# Patient Record
Sex: FEMALE | Race: WHITE | NOT HISPANIC OR LATINO | Employment: FULL TIME | URBAN - NONMETROPOLITAN AREA
[De-identification: names, ages, dates, MRNs, and addresses within clinical notes are randomized per-mention and may not be internally consistent; named-entity substitution may affect disease eponyms.]

---

## 2023-04-26 ENCOUNTER — OFFICE VISIT (OUTPATIENT)
Dept: PRIMARY CARE | Facility: CLINIC | Age: 23
End: 2023-04-26
Payer: COMMERCIAL

## 2023-04-26 VITALS
TEMPERATURE: 97.2 F | DIASTOLIC BLOOD PRESSURE: 62 MMHG | WEIGHT: 140.4 LBS | HEART RATE: 65 BPM | BODY MASS INDEX: 23.73 KG/M2 | SYSTOLIC BLOOD PRESSURE: 96 MMHG | OXYGEN SATURATION: 99 %

## 2023-04-26 DIAGNOSIS — Z02.1 PHYSICAL EXAM, PRE-EMPLOYMENT: Primary | ICD-10-CM

## 2023-04-26 DIAGNOSIS — M86.30 CHRONIC RECURRENT MULTIFOCAL OSTEOMYELITIS (MULTI): ICD-10-CM

## 2023-04-26 PROBLEM — R07.89 STERNUM PAIN: Status: ACTIVE | Noted: 2023-04-26

## 2023-04-26 PROBLEM — R32 INCONTINENCE: Status: ACTIVE | Noted: 2023-04-26

## 2023-04-26 PROBLEM — M89.8X1 CLAVICLE PAIN: Status: ACTIVE | Noted: 2023-04-26

## 2023-04-26 PROBLEM — M79.673 FOOT PAIN: Status: ACTIVE | Noted: 2023-04-26

## 2023-04-26 PROBLEM — M86.261: Status: ACTIVE | Noted: 2023-04-26

## 2023-04-26 PROCEDURE — 1036F TOBACCO NON-USER: CPT | Performed by: INTERNAL MEDICINE

## 2023-04-26 PROCEDURE — 99214 OFFICE O/P EST MOD 30 MIN: CPT | Performed by: INTERNAL MEDICINE

## 2023-04-26 RX ORDER — ACETAMINOPHEN 160 MG/5ML
20.31 SUSPENSION ORAL EVERY 6 HOURS PRN
COMMUNITY
Start: 2016-05-09

## 2023-04-26 RX ORDER — DOCUSATE SODIUM 100 MG/1
CAPSULE, LIQUID FILLED ORAL 2 TIMES DAILY
COMMUNITY
Start: 2016-05-09 | End: 2023-10-12 | Stop reason: ALTCHOICE

## 2023-04-26 RX ORDER — MELOXICAM 7.5 MG/1
1 TABLET ORAL DAILY
COMMUNITY
Start: 2018-06-21 | End: 2023-04-26 | Stop reason: SDUPTHER

## 2023-04-26 RX ORDER — CLINDAMYCIN HYDROCHLORIDE 300 MG/1
CAPSULE ORAL EVERY 8 HOURS
COMMUNITY
Start: 2016-05-09 | End: 2023-10-12 | Stop reason: ALTCHOICE

## 2023-04-26 RX ORDER — MELOXICAM 7.5 MG/1
7.5 TABLET ORAL DAILY
Qty: 90 TABLET | Refills: 0 | Status: SHIPPED | OUTPATIENT
Start: 2023-04-26 | End: 2023-07-25

## 2023-04-26 RX ORDER — NAPROXEN 500 MG/1
TABLET ORAL 2 TIMES DAILY
COMMUNITY
Start: 2023-04-24 | End: 2023-05-07

## 2023-04-26 ASSESSMENT — ENCOUNTER SYMPTOMS
BLOOD IN STOOL: 0
ARTHRALGIAS: 1
DIFFICULTY URINATING: 0
UNEXPECTED WEIGHT CHANGE: 0
DIARRHEA: 0
SINUS PAIN: 0
HEADACHES: 0
WHEEZING: 0
DIZZINESS: 0
SORE THROAT: 0
COUGH: 0
FATIGUE: 0
ABDOMINAL PAIN: 0
FEVER: 0
PALPITATIONS: 0
BRUISES/BLEEDS EASILY: 0

## 2023-04-26 NOTE — PROGRESS NOTES
Subjective   Patient ID: Steffanie Dan is a 22 y.o. female who presents for er follow for lt foot sprain, Employment Physical, clavicle bump, and osteomyletis.    - Preemployment physical exam  Patient free of any communicable diseases able to perform her work duties without any restrictions  -  CRMO  - Recurrent pain in the foot patient not taking her medication  Need to start on meloxicam as recommended follow-up if no improvement  Need to obtain CRP CBC if no improvement and hepatic panel  Reevaluate as needed           Review of Systems   Constitutional:  Negative for fatigue, fever and unexpected weight change.   HENT:  Negative for congestion, ear discharge, ear pain, mouth sores, sinus pain and sore throat.    Eyes:  Negative for visual disturbance.   Respiratory:  Negative for cough and wheezing.    Cardiovascular:  Negative for chest pain, palpitations and leg swelling.   Gastrointestinal:  Negative for abdominal pain, blood in stool and diarrhea.   Genitourinary:  Negative for difficulty urinating.   Musculoskeletal:  Positive for arthralgias.   Skin:  Negative for rash.   Neurological:  Negative for dizziness and headaches.   Hematological:  Does not bruise/bleed easily.   Psychiatric/Behavioral:  Negative for behavioral problems.    All other systems reviewed and are negative.      Objective   No results found for: HGBA1C   BP 96/62   Pulse 65   Temp 36.2 °C (97.2 °F)   Wt 63.7 kg (140 lb 6.4 oz)   SpO2 99%   BMI 23.73 kg/m²     Physical Exam  Vitals and nursing note reviewed.   Constitutional:       Appearance: Normal appearance.   HENT:      Head: Normocephalic.      Nose: Nose normal.   Eyes:      Conjunctiva/sclera: Conjunctivae normal.      Pupils: Pupils are equal, round, and reactive to light.   Cardiovascular:      Rate and Rhythm: Regular rhythm.   Pulmonary:      Effort: Pulmonary effort is normal.      Breath sounds: Normal breath sounds.   Abdominal:      General: Abdomen is flat.       Palpations: Abdomen is soft.   Musculoskeletal:         General: Tenderness present.      Cervical back: Neck supple.   Skin:     General: Skin is warm.   Neurological:      General: No focal deficit present.      Mental Status: She is oriented to person, place, and time.   Psychiatric:         Mood and Affect: Mood normal.         Assessment/Plan   Steffanie was seen today for er follow for lt foot sprain, employment physical, clavicle bump and osteomyletis.  Diagnoses and all orders for this visit:  Physical exam, pre-employment (Primary)  Chronic recurrent multifocal osteomyelitis (CMS/HCC)  -     meloxicam (Mobic) 7.5 mg tablet; Take 1 tablet (7.5 mg) by mouth once daily.

## 2023-10-12 ENCOUNTER — OFFICE VISIT (OUTPATIENT)
Dept: PRIMARY CARE | Facility: CLINIC | Age: 23
End: 2023-10-12
Payer: COMMERCIAL

## 2023-10-12 VITALS
OXYGEN SATURATION: 98 % | TEMPERATURE: 97.5 F | DIASTOLIC BLOOD PRESSURE: 62 MMHG | SYSTOLIC BLOOD PRESSURE: 94 MMHG | WEIGHT: 144.2 LBS | HEART RATE: 87 BPM | BODY MASS INDEX: 24.37 KG/M2

## 2023-10-12 DIAGNOSIS — J20.9 ACUTE BRONCHITIS, UNSPECIFIED ORGANISM: Primary | ICD-10-CM

## 2023-10-12 DIAGNOSIS — M86.30 CHRONIC RECURRENT MULTIFOCAL OSTEOMYELITIS (MULTI): ICD-10-CM

## 2023-10-12 PROCEDURE — 99214 OFFICE O/P EST MOD 30 MIN: CPT | Performed by: INTERNAL MEDICINE

## 2023-10-12 PROCEDURE — 1036F TOBACCO NON-USER: CPT | Performed by: INTERNAL MEDICINE

## 2023-10-12 RX ORDER — AZITHROMYCIN 250 MG/1
TABLET, FILM COATED ORAL
Qty: 6 TABLET | Refills: 0 | Status: SHIPPED | OUTPATIENT
Start: 2023-10-12 | End: 2023-10-17

## 2023-10-12 RX ORDER — MELOXICAM 7.5 MG/1
TABLET ORAL
COMMUNITY
Start: 2019-11-20

## 2023-10-12 ASSESSMENT — ENCOUNTER SYMPTOMS
SINUS PAIN: 0
BRUISES/BLEEDS EASILY: 0
ARTHRALGIAS: 0
DIZZINESS: 0
SORE THROAT: 1
DIFFICULTY URINATING: 0
FATIGUE: 1
FEVER: 1
BLOOD IN STOOL: 0
UNEXPECTED WEIGHT CHANGE: 0
ABDOMINAL PAIN: 0
DIARRHEA: 0
HEADACHES: 0
COUGH: 1
PALPITATIONS: 0
WHEEZING: 0

## 2023-10-12 NOTE — PROGRESS NOTES
"Subjective   Patient ID: Steffanie Dan is a 23 y.o. female who presents for Cough (X 2 weeks, dry) and Sore Throat.    - Patient working in a , complaining of cough and congestion worsening now over the last week no fever no chills  Fatigue  Joint aches  -Physical exam unremarkable underlying mild bronchitis patient will be treated with Z-Dayo increase fluid intake need to stay off work until Monday follow-up results closely  Chronic recurrent multifocal osteomyelitis (CMS/HCC)  -     meloxicam (Mobic) 7.5 mg tablet; Take 1 tablet (7.5 mg) by mouth once daily.  Stable no change  Increase fluid intake and bedrest         Cough  Associated symptoms include a fever and a sore throat. Pertinent negatives include no chest pain, ear pain, headaches, rash or wheezing.   Sore Throat   Associated symptoms include coughing. Pertinent negatives include no abdominal pain, congestion, diarrhea, ear discharge, ear pain or headaches.          Review of Systems   Constitutional:  Positive for fatigue and fever. Negative for unexpected weight change.   HENT:  Positive for sore throat. Negative for congestion, ear discharge, ear pain, mouth sores and sinus pain.    Eyes:  Negative for visual disturbance.   Respiratory:  Positive for cough. Negative for wheezing.    Cardiovascular:  Negative for chest pain, palpitations and leg swelling.   Gastrointestinal:  Negative for abdominal pain, blood in stool and diarrhea.   Genitourinary:  Negative for difficulty urinating.   Musculoskeletal:  Negative for arthralgias.   Skin:  Negative for rash.   Neurological:  Negative for dizziness and headaches.   Hematological:  Does not bruise/bleed easily.   Psychiatric/Behavioral:  Negative for behavioral problems.    All other systems reviewed and are negative.      Objective   No results found for: \"HGBA1C\"   BP 94/62   Pulse 87   Temp 36.4 °C (97.5 °F)   Wt 65.4 kg (144 lb 3.2 oz)   SpO2 98%   BMI 24.37 kg/m²   Lab Results "   Component Value Date    WBC 5.7 06/28/2021    HGB 14.1 06/28/2021    HCT 43.3 06/28/2021     06/28/2021    ALT 10 06/28/2021    AST 13 06/28/2021     06/28/2021    K 3.9 06/28/2021     06/28/2021    CREATININE 0.67 06/28/2021    BUN 11 06/28/2021    CO2 28 06/28/2021    TSH 1.84 06/28/2021     par   Physical Exam  Vitals and nursing note reviewed.   Constitutional:       Appearance: Normal appearance.   HENT:      Head: Normocephalic.      Right Ear: There is no impacted cerumen.      Left Ear: There is no impacted cerumen.      Nose: Nose normal.      Mouth/Throat:      Pharynx: No oropharyngeal exudate or posterior oropharyngeal erythema.   Eyes:      General:         Left eye: No discharge.      Conjunctiva/sclera: Conjunctivae normal.      Pupils: Pupils are equal, round, and reactive to light.   Cardiovascular:      Rate and Rhythm: Regular rhythm.   Pulmonary:      Effort: Pulmonary effort is normal.      Breath sounds: Normal breath sounds. No rhonchi.   Abdominal:      General: Abdomen is flat.      Palpations: Abdomen is soft.   Musculoskeletal:      Cervical back: Neck supple.   Skin:     General: Skin is warm.   Neurological:      General: No focal deficit present.      Mental Status: She is oriented to person, place, and time.   Psychiatric:         Mood and Affect: Mood normal.       Assessment/Plan   Steffanie was seen today for cough and sore throat.  Diagnoses and all orders for this visit:  Acute bronchitis, unspecified organism (Primary)  -     azithromycin (Zithromax) 250 mg tablet; Take 2 tablets (500 mg) by mouth once daily for 1 day, THEN 1 tablet (250 mg) once daily for 4 days. Take 2 tabs (500 mg) by mouth today, than 1 daily for 4 days..  Chronic recurrent multifocal osteomyelitis (CMS/HCC)     Patient working in a , complaining of cough and congestion worsening now over the last week no fever no chills  Fatigue  Joint aches  -Physical exam unremarkable underlying  mild bronchitis patient will be treated with Z-Dayo increase fluid intake need to stay off work until Monday follow-up results closely  Chronic recurrent multifocal osteomyelitis (CMS/HCC)  -     meloxicam (Mobic) 7.5 mg tablet; Take 1 tablet (7.5 mg) by mouth once daily.  Stable no change  Increase fluid intake and bedrest

## 2024-04-15 ENCOUNTER — OFFICE VISIT (OUTPATIENT)
Dept: RHEUMATOLOGY | Facility: CLINIC | Age: 24
End: 2024-04-15
Payer: COMMERCIAL

## 2024-04-15 VITALS
WEIGHT: 140 LBS | HEIGHT: 65 IN | HEART RATE: 73 BPM | DIASTOLIC BLOOD PRESSURE: 77 MMHG | BODY MASS INDEX: 23.32 KG/M2 | SYSTOLIC BLOOD PRESSURE: 113 MMHG

## 2024-04-15 DIAGNOSIS — M86.30 CHRONIC RECURRENT MULTIFOCAL OSTEOMYELITIS (MULTI): Primary | ICD-10-CM

## 2024-04-15 PROCEDURE — 99203 OFFICE O/P NEW LOW 30 MIN: CPT | Performed by: INTERNAL MEDICINE

## 2024-04-15 RX ORDER — NAPROXEN 500 MG/1
500 TABLET ORAL 2 TIMES DAILY PRN
COMMUNITY

## 2024-04-15 NOTE — PROGRESS NOTES
Subjective   Patient ID: Steffanie Dan is a 23 y.o. female who presents for New Patient Visit.  HPI  Patient with history of chronic recurrent multifocal osteomyelitis previously seen by Dr. Ping Raines.  I accompanied by her mother today.    She started having symptoms when she was about 16 years old.  Feels like she was walking on nails.  This symptom is not present today.  She had a surgery in 2016 for what was thought to be an infected bone.  She has been on chronic anti-inflammatories including meloxicam.  There was some concerns about medication compliance at the time.  Currently only takes anti-inflammatories as needed.  Activities include mostly walking.  Denies any injuries  Has a history of febrile seizures   social history is negative for tobacco, alcohol, illicit drug use  Family history of diabetes, hypertension, thyroid disease possible lupus.  Review of Systems   Constitutional:  Negative for fatigue and fever.   HENT:  Negative for mouth sores.    Eyes:  Negative for pain and visual disturbance.   Respiratory:  Negative for shortness of breath.    Cardiovascular:  Negative for chest pain.   Gastrointestinal:  Negative for abdominal pain.   Musculoskeletal:         Morning stiffness about 10 minutes   Skin:  Negative for rash.   Neurological:  Negative for headaches.   Hematological:  Negative for adenopathy.       Objective   Physical Exam  GEN: NAD A&O x3 appropriate affect  HENT:M no enlarged glands or thyroid  EYES:  no conjunctival redness, eyelids normal  LYMPH: no cervical  lymphadenopathy  SKIN: no rashes, ulcers, or subcutaneous nodules  PULSES: +radials  TENDER POINTS: 0/18   MSK:  No synovitis or joint tenderness  Assessment/Plan     Patient with chronic recurrent multifocal osteomyelitis.  This is considered a sterile osteomyelitis and not infection.  Previous treatments have included anti-inflammatories.  Has been treated mostly with anti-inflammatories.  Discussed that this can  also be treated with anti-TNF inhibitors depending on involvement.  Will check Her blood work.  If she does have elevated inflammatory markers may consider other treatment then NSAID.  Will also get a new PET scan.  Will discuss her results once they have returned.    Autumn Murray MD 04/15/24 1:40 PM

## 2024-04-26 ENCOUNTER — LAB (OUTPATIENT)
Dept: LAB | Facility: LAB | Age: 24
End: 2024-04-26
Payer: COMMERCIAL

## 2024-04-26 DIAGNOSIS — M86.30 CHRONIC RECURRENT MULTIFOCAL OSTEOMYELITIS (MULTI): ICD-10-CM

## 2024-04-26 LAB
ALBUMIN SERPL BCP-MCNC: 4.5 G/DL (ref 3.4–5)
ALP SERPL-CCNC: 63 U/L (ref 33–110)
ALT SERPL W P-5'-P-CCNC: 11 U/L (ref 7–45)
ANION GAP SERPL CALC-SCNC: 13 MMOL/L (ref 10–20)
AST SERPL W P-5'-P-CCNC: 14 U/L (ref 9–39)
BILIRUB SERPL-MCNC: 0.4 MG/DL (ref 0–1.2)
BUN SERPL-MCNC: 15 MG/DL (ref 6–23)
CALCIUM SERPL-MCNC: 9.6 MG/DL (ref 8.6–10.3)
CHLORIDE SERPL-SCNC: 105 MMOL/L (ref 98–107)
CO2 SERPL-SCNC: 24 MMOL/L (ref 21–32)
CREAT SERPL-MCNC: 0.64 MG/DL (ref 0.5–1.05)
CRP SERPL-MCNC: <0.1 MG/DL
EGFRCR SERPLBLD CKD-EPI 2021: >90 ML/MIN/1.73M*2
ERYTHROCYTE [DISTWIDTH] IN BLOOD BY AUTOMATED COUNT: 12.5 % (ref 11.5–14.5)
ERYTHROCYTE [SEDIMENTATION RATE] IN BLOOD BY WESTERGREN METHOD: 8 MM/H (ref 0–20)
GLUCOSE SERPL-MCNC: 98 MG/DL (ref 74–99)
HCT VFR BLD AUTO: 40.7 % (ref 36–46)
HGB BLD-MCNC: 13.4 G/DL (ref 12–16)
MCH RBC QN AUTO: 29.8 PG (ref 26–34)
MCHC RBC AUTO-ENTMCNC: 32.9 G/DL (ref 32–36)
MCV RBC AUTO: 90 FL (ref 80–100)
NRBC BLD-RTO: 0 /100 WBCS (ref 0–0)
PLATELET # BLD AUTO: 337 X10*3/UL (ref 150–450)
POTASSIUM SERPL-SCNC: 3.8 MMOL/L (ref 3.5–5.3)
PROT SERPL-MCNC: 7.7 G/DL (ref 6.4–8.2)
RBC # BLD AUTO: 4.5 X10*6/UL (ref 4–5.2)
SODIUM SERPL-SCNC: 138 MMOL/L (ref 136–145)
WBC # BLD AUTO: 5.8 X10*3/UL (ref 4.4–11.3)

## 2024-04-26 PROCEDURE — 86038 ANTINUCLEAR ANTIBODIES: CPT

## 2024-04-26 PROCEDURE — 83519 RIA NONANTIBODY: CPT

## 2024-04-26 PROCEDURE — 81381 HLA I TYPING 1 ALLELE HR: CPT

## 2024-04-26 PROCEDURE — 84078 ASSAY ALKALINE PHOSPHATASE: CPT

## 2024-04-26 PROCEDURE — 86235 NUCLEAR ANTIGEN ANTIBODY: CPT

## 2024-04-26 PROCEDURE — 86200 CCP ANTIBODY: CPT

## 2024-04-26 PROCEDURE — 86225 DNA ANTIBODY NATIVE: CPT

## 2024-04-26 PROCEDURE — 36415 COLL VENOUS BLD VENIPUNCTURE: CPT

## 2024-04-26 PROCEDURE — 86431 RHEUMATOID FACTOR QUANT: CPT

## 2024-04-27 LAB
CCP IGG SERPL-ACNC: <1 U/ML
CENTROMERE B AB SER-ACNC: <0.2 AI
CHROMATIN AB SERPL-ACNC: <0.2 AI
DSDNA AB SER-ACNC: 2 IU/ML
ENA JO1 AB SER QL IA: <0.2 AI
ENA RNP AB SER IA-ACNC: <0.2 AI
ENA SCL70 AB SER QL IA: <0.2 AI
ENA SM AB SER IA-ACNC: <0.2 AI
ENA SM+RNP AB SER QL IA: <0.2 AI
ENA SS-A AB SER IA-ACNC: <0.2 AI
ENA SS-B AB SER IA-ACNC: <0.2 AI
RHEUMATOID FACT SER NEPH-ACNC: <10 IU/ML (ref 0–15)
RIBOSOMAL P AB SER-ACNC: <0.2 AI

## 2024-04-28 LAB — ALP BONE SERPL-MCNC: 10.8 UG/L

## 2024-04-30 LAB
ANA PATTERN: ABNORMAL
ANA SER QL HEP2 SUBST: POSITIVE
ANA TITR SER IF: ABNORMAL {TITER}
GAD65 AB SER IA-ACNC: <5 IU/ML (ref 0–5)

## 2024-05-01 LAB — HLAB27 TYPING: NEGATIVE

## 2024-05-10 ENCOUNTER — HOSPITAL ENCOUNTER (OUTPATIENT)
Dept: RADIOLOGY | Facility: HOSPITAL | Age: 24
Discharge: HOME | End: 2024-05-10
Payer: COMMERCIAL

## 2024-05-10 DIAGNOSIS — M86.30 CHRONIC RECURRENT MULTIFOCAL OSTEOMYELITIS (MULTI): ICD-10-CM

## 2024-05-10 PROCEDURE — 78306 BONE IMAGING WHOLE BODY: CPT | Performed by: NUCLEAR MEDICINE

## 2024-05-10 PROCEDURE — 78306 BONE IMAGING WHOLE BODY: CPT

## 2024-05-10 PROCEDURE — 3430000001 HC RX 343 DIAGNOSTIC RADIOPHARMACEUTICALS: Performed by: INTERNAL MEDICINE

## 2024-05-10 PROCEDURE — A9503 TC99M MEDRONATE: HCPCS | Performed by: INTERNAL MEDICINE

## 2024-05-10 RX ADMIN — TECHNETIUM TC 99M MEDRONATE 25.9 MILLICURIE: 25 INJECTION, POWDER, FOR SOLUTION INTRAVENOUS at 10:10

## 2024-05-13 ASSESSMENT — ENCOUNTER SYMPTOMS
HEADACHES: 0
ABDOMINAL PAIN: 0
FATIGUE: 0
ADENOPATHY: 0
FEVER: 0
EYE PAIN: 0
SHORTNESS OF BREATH: 0

## 2024-09-18 ENCOUNTER — APPOINTMENT (OUTPATIENT)
Dept: PRIMARY CARE | Facility: CLINIC | Age: 24
End: 2024-09-18
Payer: COMMERCIAL

## 2024-09-18 VITALS
HEART RATE: 67 BPM | WEIGHT: 146.4 LBS | TEMPERATURE: 96.8 F | BODY MASS INDEX: 24.74 KG/M2 | DIASTOLIC BLOOD PRESSURE: 62 MMHG | OXYGEN SATURATION: 97 % | SYSTOLIC BLOOD PRESSURE: 100 MMHG

## 2024-09-18 DIAGNOSIS — Z02.1 PHYSICAL EXAM, PRE-EMPLOYMENT: Primary | ICD-10-CM

## 2024-09-18 PROCEDURE — 1036F TOBACCO NON-USER: CPT | Performed by: INTERNAL MEDICINE

## 2024-09-18 PROCEDURE — 99395 PREV VISIT EST AGE 18-39: CPT | Performed by: INTERNAL MEDICINE

## 2024-09-18 ASSESSMENT — ENCOUNTER SYMPTOMS
COUGH: 0
WHEEZING: 0
DIZZINESS: 0
PALPITATIONS: 0
FATIGUE: 0
HEADACHES: 0
ABDOMINAL PAIN: 0
FEVER: 0
ARTHRALGIAS: 0
BLOOD IN STOOL: 0
BRUISES/BLEEDS EASILY: 0
SINUS PAIN: 0
DIFFICULTY URINATING: 0
DIARRHEA: 0
SORE THROAT: 0
UNEXPECTED WEIGHT CHANGE: 0

## 2024-09-18 NOTE — PROGRESS NOTES
"Subjective   Patient ID: Steffanie Dan is a 24 y.o. female who presents for paperwork (Paperwork for work).    Today for preemployment physical exam working in a  denies any complaints  -Needs titers for previous vaccinations order placed  -Needs a T spot test  - Chronic recurrent multifocal osteomyelitis (CMS/HCC)  -     meloxicam (Mobic) 7.5 mg tablet; Take 1 tablet (7.5 mg) by mouth once daily.  Stable no change  Increase fluid intake and bedrest  Patient patient physically fit for  employment  Will follow-up with vaccination titer accordingly                Review of Systems   Constitutional:  Negative for fatigue, fever and unexpected weight change.   HENT:  Negative for congestion, ear discharge, ear pain, mouth sores, sinus pain and sore throat.    Eyes:  Negative for visual disturbance.   Respiratory:  Negative for cough and wheezing.    Cardiovascular:  Negative for chest pain, palpitations and leg swelling.   Gastrointestinal:  Negative for abdominal pain, blood in stool and diarrhea.   Genitourinary:  Negative for difficulty urinating.   Musculoskeletal:  Negative for arthralgias.   Skin:  Negative for rash.   Neurological:  Negative for dizziness and headaches.   Hematological:  Does not bruise/bleed easily.   Psychiatric/Behavioral:  Negative for behavioral problems.    All other systems reviewed and are negative.      Objective   No results found for: \"HGBA1C\"   /62   Pulse 67   Temp 36 °C (96.8 °F)   Wt 66.4 kg (146 lb 6.4 oz)   SpO2 97%   BMI 24.74 kg/m²   Lab Results   Component Value Date    WBC 5.8 04/26/2024    HGB 13.4 04/26/2024    HCT 40.7 04/26/2024     04/26/2024    ALT 11 04/26/2024    AST 14 04/26/2024     04/26/2024    K 3.8 04/26/2024     04/26/2024    CREATININE 0.64 04/26/2024    BUN 15 04/26/2024    CO2 24 04/26/2024    TSH 1.84 06/28/2021     par   Physical Exam  Vitals and nursing note reviewed.   Constitutional:       Appearance: Normal " appearance.   HENT:      Head: Normocephalic.      Nose: Nose normal.   Eyes:      Conjunctiva/sclera: Conjunctivae normal.      Pupils: Pupils are equal, round, and reactive to light.   Cardiovascular:      Rate and Rhythm: Regular rhythm.   Pulmonary:      Effort: Pulmonary effort is normal.      Breath sounds: Normal breath sounds.   Abdominal:      General: Abdomen is flat.      Palpations: Abdomen is soft.   Musculoskeletal:      Cervical back: Neck supple.   Skin:     General: Skin is warm.   Neurological:      General: No focal deficit present.      Mental Status: She is oriented to person, place, and time.   Psychiatric:         Mood and Affect: Mood normal.         Assessment/Plan   Steffanie was seen today for paperwork.  Diagnoses and all orders for this visit:  Physical exam, pre-employment (Primary)  -     Hepatitis B surface antibody; Future  -     Diphtheria Antibody; Future  -     Mumps Antibody, IgG; Future  -     Rubella Antibody, IgG; Future  -     Rubeola Antibody, IgG; Future  -     T-Spot TB; Future  -     Varicella Zoster Antibody, IgG; Future   Today for preemployment physical exam working in a  denies any complaints  -Needs titers for previous vaccinations order placed  -Needs a T spot test  - Chronic recurrent multifocal osteomyelitis (CMS/HCC)  -     meloxicam (Mobic) 7.5 mg tablet; Take 1 tablet (7.5 mg) by mouth once daily.  Stable no change  Increase fluid intake and bedrest  Patient patient physically fit for  employment  Will follow-up with vaccination titer accordingly

## 2024-10-17 ENCOUNTER — LAB (OUTPATIENT)
Dept: LAB | Facility: LAB | Age: 24
End: 2024-10-17

## 2024-10-17 DIAGNOSIS — Z02.1 PHYSICAL EXAM, PRE-EMPLOYMENT: ICD-10-CM

## 2024-10-17 LAB
HBV SURFACE AB SER-ACNC: 59.8 MIU/ML
MEV IGG SER QL IA: POSITIVE
MUMPS IGG ANTIBODY INDEX: 3.3 IA
MUV IGG SER IA-ACNC: POSITIVE
RUBEOLA IGG ANTIBODY INDEX: 3.8 IA
RUBV IGG SERPL IA-ACNC: 2.3 IA
RUBV IGG SERPL QL IA: POSITIVE
VARICELLA ZOSTER IGG INDEX: 0.9 IA
VZV IGG SER QL IA: ABNORMAL

## 2024-10-17 PROCEDURE — 86765 RUBEOLA ANTIBODY: CPT

## 2024-10-17 PROCEDURE — 36415 COLL VENOUS BLD VENIPUNCTURE: CPT

## 2024-10-17 PROCEDURE — 86481 TB AG RESPONSE T-CELL SUSP: CPT

## 2024-10-17 PROCEDURE — 86735 MUMPS ANTIBODY: CPT

## 2024-10-17 PROCEDURE — 86706 HEP B SURFACE ANTIBODY: CPT

## 2024-10-17 PROCEDURE — 86787 VARICELLA-ZOSTER ANTIBODY: CPT

## 2024-10-17 PROCEDURE — 86317 IMMUNOASSAY INFECTIOUS AGENT: CPT

## 2024-10-19 LAB
NIL(NEG) CONTROL SPOT COUNT: NORMAL
PANEL A SPOT COUNT: 0
PANEL B SPOT COUNT: 0
POS CONTROL SPOT COUNT: NORMAL
T-SPOT. TB INTERPRETATION: NEGATIVE

## 2024-10-21 LAB — C DIPHTHERIAE IGG SER-ACNC: 0.2 IU/ML

## 2024-10-22 ENCOUNTER — HOSPITAL ENCOUNTER (OUTPATIENT)
Dept: RADIOLOGY | Facility: HOSPITAL | Age: 24
Discharge: HOME | End: 2024-10-22
Payer: COMMERCIAL

## 2024-10-22 ENCOUNTER — TELEPHONE (OUTPATIENT)
Dept: PRIMARY CARE | Facility: CLINIC | Age: 24
End: 2024-10-22
Payer: COMMERCIAL

## 2024-10-22 ENCOUNTER — OFFICE VISIT (OUTPATIENT)
Dept: PRIMARY CARE | Facility: CLINIC | Age: 24
End: 2024-10-22
Payer: COMMERCIAL

## 2024-10-22 VITALS
BODY MASS INDEX: 24.5 KG/M2 | HEART RATE: 79 BPM | DIASTOLIC BLOOD PRESSURE: 72 MMHG | TEMPERATURE: 97.2 F | SYSTOLIC BLOOD PRESSURE: 112 MMHG | OXYGEN SATURATION: 99 % | WEIGHT: 145 LBS

## 2024-10-22 DIAGNOSIS — J45.31 MILD PERSISTENT ASTHMATIC BRONCHITIS WITH ACUTE EXACERBATION (HHS-HCC): Primary | ICD-10-CM

## 2024-10-22 DIAGNOSIS — M86.30 CHRONIC RECURRENT MULTIFOCAL OSTEOMYELITIS (MULTI): ICD-10-CM

## 2024-10-22 DIAGNOSIS — J45.31 MILD PERSISTENT ASTHMATIC BRONCHITIS WITH ACUTE EXACERBATION (HHS-HCC): ICD-10-CM

## 2024-10-22 PROCEDURE — 99214 OFFICE O/P EST MOD 30 MIN: CPT | Performed by: INTERNAL MEDICINE

## 2024-10-22 PROCEDURE — 1036F TOBACCO NON-USER: CPT | Performed by: INTERNAL MEDICINE

## 2024-10-22 PROCEDURE — 71046 X-RAY EXAM CHEST 2 VIEWS: CPT

## 2024-10-22 PROCEDURE — 71046 X-RAY EXAM CHEST 2 VIEWS: CPT | Performed by: RADIOLOGY

## 2024-10-22 RX ORDER — AZITHROMYCIN 250 MG/1
TABLET, FILM COATED ORAL
Qty: 6 TABLET | Refills: 0 | Status: SHIPPED | OUTPATIENT
Start: 2024-10-22 | End: 2024-10-27

## 2024-10-22 RX ORDER — METHYLPREDNISOLONE 4 MG/1
TABLET ORAL
Qty: 21 TABLET | Refills: 0 | Status: SHIPPED | OUTPATIENT
Start: 2024-10-22 | End: 2024-10-29

## 2024-10-22 ASSESSMENT — ENCOUNTER SYMPTOMS
HEADACHES: 0
SORE THROAT: 0
FEVER: 1
UNEXPECTED WEIGHT CHANGE: 0
VOMITING: 1
DIARRHEA: 0
ARTHRALGIAS: 0
WHEEZING: 0
PALPITATIONS: 0
FATIGUE: 1
DIFFICULTY URINATING: 0
ABDOMINAL PAIN: 0
DIZZINESS: 0
SINUS PAIN: 0
COUGH: 1
BRUISES/BLEEDS EASILY: 0
BLOOD IN STOOL: 0

## 2024-10-22 NOTE — PROGRESS NOTES
Subjective   Patient ID: Steffanie Dan is a 24 y.o. female who presents for Vomiting, Cough (With wheezing x 7 days), Fatigue, chest hurt, Anorexia (Loss of appetite), Fever, and Chills.    - Patient complaining of nausea vomiting abdominal pain since Thursday  -Tested twice negative for COVID  -Asthmatic bronchitis  Obtain x-ray starting Z-Dayo  Start Medrol Dosepak  - Nausea and vomiting.  Avoid any acidic food warm fluids Tylenol as needed follow-up if no improvement  May use Pepcid over-the-counter as needed  - Preemployment physical exam and blood work came back negative patient may proceed with employment as recommended  - Patient need to stay 3 days off work until complete resolution of her symptoms [facility]  - History of chronic multifocal recurrent osteomyelitis: No change continue with meloxicam    Vomiting   Associated symptoms include coughing and a fever. Pertinent negatives include no abdominal pain, arthralgias, chest pain, diarrhea, dizziness or headaches.   Cough  Associated symptoms include a fever. Pertinent negatives include no chest pain, ear pain, headaches, rash, sore throat or wheezing.   Fatigue  Associated symptoms include coughing, fatigue, a fever and vomiting. Pertinent negatives include no abdominal pain, arthralgias, chest pain, congestion, headaches, rash or sore throat.   Fever   Associated symptoms include coughing and vomiting. Pertinent negatives include no abdominal pain, chest pain, congestion, diarrhea, ear pain, headaches, rash, sore throat or wheezing.          Review of Systems   Constitutional:  Positive for fatigue and fever. Negative for unexpected weight change.   HENT:  Negative for congestion, ear discharge, ear pain, mouth sores, sinus pain and sore throat.    Eyes:  Negative for visual disturbance.   Respiratory:  Positive for cough. Negative for wheezing.    Cardiovascular:  Negative for chest pain, palpitations and leg swelling.  "  Gastrointestinal:  Positive for vomiting. Negative for abdominal pain, blood in stool and diarrhea.   Genitourinary:  Negative for difficulty urinating.   Musculoskeletal:  Negative for arthralgias.   Skin:  Negative for rash.   Neurological:  Negative for dizziness and headaches.   Hematological:  Does not bruise/bleed easily.   Psychiatric/Behavioral:  Negative for behavioral problems.    All other systems reviewed and are negative.      Objective   No results found for: \"HGBA1C\"   /72   Pulse 79   Temp 36.2 °C (97.2 °F)   Wt 65.8 kg (145 lb)   SpO2 99%   BMI 24.50 kg/m²     Physical Exam  Vitals and nursing note reviewed.   Constitutional:       Appearance: Normal appearance.   HENT:      Head: Normocephalic.      Nose: Nose normal.   Eyes:      Conjunctiva/sclera: Conjunctivae normal.      Pupils: Pupils are equal, round, and reactive to light.   Cardiovascular:      Rate and Rhythm: Regular rhythm.   Pulmonary:      Effort: Pulmonary effort is normal.      Breath sounds: Wheezing present.   Abdominal:      General: Abdomen is flat.      Palpations: Abdomen is soft.   Musculoskeletal:      Cervical back: Neck supple.   Skin:     General: Skin is warm.   Neurological:      General: No focal deficit present.      Mental Status: She is oriented to person, place, and time.   Psychiatric:         Mood and Affect: Mood normal.       Lab Results   Component Value Date    WBC 5.8 04/26/2024    HGB 13.4 04/26/2024    HCT 40.7 04/26/2024     04/26/2024    ALT 11 04/26/2024    AST 14 04/26/2024     04/26/2024    K 3.8 04/26/2024     04/26/2024    CREATININE 0.64 04/26/2024    BUN 15 04/26/2024    CO2 24 04/26/2024    TSH 1.84 06/28/2021     par     Assessment/Plan   Steffanie was seen today for vomiting, cough, fatigue, chest hurt, anorexia, fever and chills.  Diagnoses and all orders for this visit:  Mild persistent asthmatic bronchitis with acute exacerbation (HHS-HCC) (Primary)  -     XR " chest 2 views; Future  -     methylPREDNISolone (Medrol Dospak) 4 mg tablets; Follow schedule on package instructions  -     azithromycin (Zithromax) 250 mg tablet; Take 2 tablets (500 mg) by mouth once daily for 1 day, THEN 1 tablet (250 mg) once daily for 4 days. Take 2 tabs (500 mg) by mouth today, than 1 daily for 4 days..  Chronic recurrent multifocal osteomyelitis (Multi)   - Patient complaining of nausea vomiting abdominal pain since Thursday  -Tested twice negative for COVID  -Asthmatic bronchitis  Obtain x-ray starting Z-Dayo  Start Medrol Dosepak  - Nausea and vomiting.  Avoid any acidic food warm fluids Tylenol as needed follow-up if no improvement  May use Pepcid over-the-counter as needed  - Preemployment physical exam and blood work came back negative patient may proceed with employment as recommended  - Patient need to stay 3 days off work until complete resolution of her symptoms [facility]  - History of chronic multifocal recurrent osteomyelitis: No change continue with meloxicam

## 2024-10-22 NOTE — TELEPHONE ENCOUNTER
Patient requesting appt for today. Does not want to do virtual wants to come into office. Vomiting, cough, wheezing, cough causing pains in chest since Thursday. Covid negative yesterday.

## 2024-10-25 ENCOUNTER — TELEPHONE (OUTPATIENT)
Dept: PRIMARY CARE | Facility: CLINIC | Age: 24
End: 2024-10-25
Payer: COMMERCIAL

## 2025-02-17 ENCOUNTER — OFFICE VISIT (OUTPATIENT)
Dept: PRIMARY CARE | Facility: CLINIC | Age: 25
End: 2025-02-17
Payer: COMMERCIAL

## 2025-02-17 VITALS
WEIGHT: 156.8 LBS | BODY MASS INDEX: 26.5 KG/M2 | SYSTOLIC BLOOD PRESSURE: 108 MMHG | TEMPERATURE: 97.1 F | OXYGEN SATURATION: 98 % | DIASTOLIC BLOOD PRESSURE: 64 MMHG | HEART RATE: 83 BPM

## 2025-02-17 DIAGNOSIS — M86.30 CHRONIC RECURRENT MULTIFOCAL OSTEOMYELITIS (MULTI): ICD-10-CM

## 2025-02-17 DIAGNOSIS — H10.11 ALLERGIC CONJUNCTIVITIS OF RIGHT EYE: Primary | ICD-10-CM

## 2025-02-17 PROCEDURE — 99214 OFFICE O/P EST MOD 30 MIN: CPT | Performed by: INTERNAL MEDICINE

## 2025-02-17 PROCEDURE — 1036F TOBACCO NON-USER: CPT | Performed by: INTERNAL MEDICINE

## 2025-02-17 RX ORDER — NEOMYCIN SULFATE, POLYMYXIN B SULFATE, AND DEXAMETHASONE 3.5; 10000; 1 MG/G; [USP'U]/G; MG/G
OINTMENT OPHTHALMIC EVERY 6 HOURS SCHEDULED
Qty: 3.5 G | Refills: 0 | Status: SHIPPED | OUTPATIENT
Start: 2025-02-17

## 2025-02-17 ASSESSMENT — ENCOUNTER SYMPTOMS
EYE DISCHARGE: 1
SINUS PAIN: 0
DIZZINESS: 0
PALPITATIONS: 0
EYE ITCHING: 1
DIFFICULTY URINATING: 0
UNEXPECTED WEIGHT CHANGE: 0
COUGH: 0
DIARRHEA: 0
SORE THROAT: 0
BRUISES/BLEEDS EASILY: 0
FEVER: 0
ARTHRALGIAS: 0
BLOOD IN STOOL: 0
ABDOMINAL PAIN: 0
WHEEZING: 0
HEADACHES: 0
FATIGUE: 0

## 2025-02-17 NOTE — PROGRESS NOTES
"ThisSubjective   Patient ID: Steffanie Dan is a 24 y.o. female who presents for Follow-up (Urgent care follow up Friday, Loratadine, erythromycin ointment burned, then given ciprofloxacin making burn also, crusting, tearing, burning,).     - Patient comes today accompanied by her mother seen in the urgent care last week diagnosed with right pinkeye treated with erythromycin patient developed rash itching went back to the emergency room Cipro eyedrops added increased redness and irritation comes today for further eval recommendation  Patient clenched her right eye underlying photophobia tearing and itching rash and swelling of the upper eyelid with superficial dermatitis  Patient counseled about underlying conjunctivitis possible allergic reaction to current treatment  Need to discontinue erythromycin and Cipro  -Start patient neomycin polymyxin dexamethasone ointment every 6 hours for 3 days follow-up if no improvement  - - History of chronic multifocal recurrent osteomyelitis: No change continue with meloxicam  Follow-up as needed             Review of Systems   Constitutional:  Negative for fatigue, fever and unexpected weight change.   HENT:  Negative for congestion, ear discharge, ear pain, mouth sores, sinus pain and sore throat.    Eyes:  Positive for discharge, itching and visual disturbance.   Respiratory:  Negative for cough and wheezing.    Cardiovascular:  Negative for chest pain, palpitations and leg swelling.   Gastrointestinal:  Negative for abdominal pain, blood in stool and diarrhea.   Genitourinary:  Negative for difficulty urinating.   Musculoskeletal:  Negative for arthralgias.   Skin:  Negative for rash.   Neurological:  Negative for dizziness and headaches.   Hematological:  Does not bruise/bleed easily.   Psychiatric/Behavioral:  Negative for behavioral problems.    All other systems reviewed and are negative.      Objective   No results found for: \"HGBA1C\"   /64   Pulse 83   Temp 36.2 " °C (97.1 °F)   Wt 71.1 kg (156 lb 12.8 oz)   SpO2 98%   BMI 26.50 kg/m²     Physical Exam  Vitals and nursing note reviewed.   Constitutional:       General: She is not in acute distress.     Appearance: Normal appearance. She is not ill-appearing, toxic-appearing or diaphoretic.   HENT:      Head: Normocephalic.      Nose: Nose normal.   Eyes:      General:         Right eye: No discharge.         Left eye: Discharge present.     Extraocular Movements: Extraocular movements intact.      Pupils: Pupils are equal, round, and reactive to light.      Comments: Right upper lower eyelid swelling and redness with contact dermatitis   Cardiovascular:      Rate and Rhythm: Regular rhythm.   Pulmonary:      Effort: Pulmonary effort is normal.      Breath sounds: Normal breath sounds.   Abdominal:      General: Abdomen is flat.      Palpations: Abdomen is soft.   Musculoskeletal:      Cervical back: Neck supple.   Skin:     General: Skin is warm.   Neurological:      General: No focal deficit present.      Mental Status: She is oriented to person, place, and time.   Psychiatric:         Mood and Affect: Mood normal.         Assessment/Plan   Steffanie was seen today for follow-up.  Diagnoses and all orders for this visit:  Allergic conjunctivitis of right eye (Primary)  -     neomycin-polymyxin B-dexameth (Polydex) 3.5 mg/g-10,000 unit/g-0.1 % ointment ophthalmic ointment; Apply to right eye every 6 hours.  Chronic recurrent multifocal osteomyelitis (Multi)   - Patient comes today accompanied by her mother seen in the urgent care last week diagnosed with right pinkeye treated with erythromycin patient developed rash itching went back to the emergency room Cipro eyedrops added increased redness and irritation comes today for further eval recommendation  Patient clenched her right eye underlying photophobia tearing and itching rash and swelling of the upper eyelid with superficial dermatitis  Patient counseled about underlying  conjunctivitis possible allergic reaction to current treatment  Need to discontinue erythromycin and Cipro  -Start patient neomycin polymyxin dexamethasone ointment every 6 hours for 3 days follow-up if no improvement  - - History of chronic multifocal recurrent osteomyelitis: No change continue with meloxicam  Follow-up as needed

## 2025-03-19 ENCOUNTER — APPOINTMENT (OUTPATIENT)
Dept: PRIMARY CARE | Facility: CLINIC | Age: 25
End: 2025-03-19
Payer: COMMERCIAL

## 2025-05-13 ENCOUNTER — HOSPITAL ENCOUNTER (EMERGENCY)
Facility: HOSPITAL | Age: 25
Discharge: HOME | End: 2025-05-14
Attending: EMERGENCY MEDICINE
Payer: COMMERCIAL

## 2025-05-13 ENCOUNTER — APPOINTMENT (OUTPATIENT)
Dept: RADIOLOGY | Facility: HOSPITAL | Age: 25
End: 2025-05-13
Payer: COMMERCIAL

## 2025-05-13 DIAGNOSIS — S61.219A LACERATION OF FINGER OF LEFT HAND, INITIAL ENCOUNTER: ICD-10-CM

## 2025-05-13 DIAGNOSIS — S60.222A CONTUSION OF LEFT HAND, INITIAL ENCOUNTER: Primary | ICD-10-CM

## 2025-05-13 PROCEDURE — 73130 X-RAY EXAM OF HAND: CPT | Mod: LT

## 2025-05-13 PROCEDURE — 12001 RPR S/N/AX/GEN/TRNK 2.5CM/<: CPT

## 2025-05-13 PROCEDURE — 73130 X-RAY EXAM OF HAND: CPT | Mod: LEFT SIDE | Performed by: RADIOLOGY

## 2025-05-13 PROCEDURE — 99283 EMERGENCY DEPT VISIT LOW MDM: CPT | Performed by: EMERGENCY MEDICINE

## 2025-05-13 ASSESSMENT — PAIN DESCRIPTION - ORIENTATION: ORIENTATION: LEFT

## 2025-05-13 ASSESSMENT — PAIN DESCRIPTION - PAIN TYPE: TYPE: ACUTE PAIN

## 2025-05-13 ASSESSMENT — COLUMBIA-SUICIDE SEVERITY RATING SCALE - C-SSRS
6. HAVE YOU EVER DONE ANYTHING, STARTED TO DO ANYTHING, OR PREPARED TO DO ANYTHING TO END YOUR LIFE?: NO
1. IN THE PAST MONTH, HAVE YOU WISHED YOU WERE DEAD OR WISHED YOU COULD GO TO SLEEP AND NOT WAKE UP?: NO
2. HAVE YOU ACTUALLY HAD ANY THOUGHTS OF KILLING YOURSELF?: NO

## 2025-05-13 ASSESSMENT — PAIN DESCRIPTION - LOCATION: LOCATION: HAND

## 2025-05-13 ASSESSMENT — PAIN SCALES - GENERAL: PAINLEVEL_OUTOF10: 7

## 2025-05-13 ASSESSMENT — PAIN - FUNCTIONAL ASSESSMENT: PAIN_FUNCTIONAL_ASSESSMENT: 0-10

## 2025-05-14 VITALS
OXYGEN SATURATION: 99 % | RESPIRATION RATE: 18 BRPM | HEIGHT: 65 IN | DIASTOLIC BLOOD PRESSURE: 82 MMHG | BODY MASS INDEX: 24.99 KG/M2 | HEART RATE: 64 BPM | WEIGHT: 150 LBS | SYSTOLIC BLOOD PRESSURE: 131 MMHG | TEMPERATURE: 98.3 F

## 2025-05-14 PROCEDURE — 2500000001 HC RX 250 WO HCPCS SELF ADMINISTERED DRUGS (ALT 637 FOR MEDICARE OP)

## 2025-05-14 RX ORDER — KETOROLAC TROMETHAMINE 10 MG/1
TABLET, FILM COATED ORAL
Status: COMPLETED
Start: 2025-05-14 | End: 2025-05-14

## 2025-05-14 RX ORDER — CEPHALEXIN 250 MG/1
500 CAPSULE ORAL ONCE
Status: COMPLETED | OUTPATIENT
Start: 2025-05-14 | End: 2025-05-14

## 2025-05-14 RX ORDER — CEPHALEXIN 500 MG/1
500 CAPSULE ORAL 2 TIMES DAILY
Qty: 20 CAPSULE | Refills: 0 | Status: SHIPPED | OUTPATIENT
Start: 2025-05-14 | End: 2025-05-24

## 2025-05-14 RX ORDER — KETOROLAC TROMETHAMINE 10 MG/1
10 TABLET, FILM COATED ORAL EVERY 6 HOURS PRN
Qty: 20 TABLET | Refills: 0 | Status: CANCELLED | OUTPATIENT
Start: 2025-05-14 | End: 2025-05-19

## 2025-05-14 RX ORDER — KETOROLAC TROMETHAMINE 10 MG/1
10 TABLET, FILM COATED ORAL ONCE
Status: COMPLETED | OUTPATIENT
Start: 2025-05-14 | End: 2025-05-14

## 2025-05-14 RX ORDER — CEPHALEXIN 250 MG/1
CAPSULE ORAL
Status: COMPLETED
Start: 2025-05-14 | End: 2025-05-14

## 2025-05-14 RX ADMIN — KETOROLAC TROMETHAMINE 10 MG: 10 TABLET, FILM COATED ORAL at 00:44

## 2025-05-14 RX ADMIN — CEPHALEXIN 500 MG: 250 CAPSULE ORAL at 00:59

## 2025-05-14 ASSESSMENT — PAIN SCALES - GENERAL: PAINLEVEL_OUTOF10: 2

## 2025-05-14 NOTE — ED TRIAGE NOTES
Pt states that she was walking and tripped over a scooter, pt's left hand is swollen, small laceration to left index finger. Pt states she had a bloody nose and chipped her tooth when she fell too.

## 2025-05-14 NOTE — ED PROVIDER NOTES
HPI   Chief Complaint   Patient presents with    Hand Injury       Patient fell 3 days ago and injured her left hand.  She sustained some swelling over the dorsum as well as a small laceration to the left index finger, middle phalanx            Patient History   Medical History[1]  Surgical History[2]  Family History[3]  Social History[4]    Physical Exam   ED Triage Vitals [05/13/25 2224]   Temperature Heart Rate Respirations BP   36.9 °C (98.4 °F) 70 16 130/78      Pulse Ox Temp Source Heart Rate Source Patient Position   98 % Temporal -- Sitting      BP Location FiO2 (%)     Right arm --       Physical Exam  Vitals and nursing note reviewed.   HENT:      Head: Normocephalic and atraumatic.      Right Ear: Ear canal normal.      Left Ear: Ear canal normal.      Nose: Nose normal.      Mouth/Throat:      Mouth: Mucous membranes are moist.   Cardiovascular:      Rate and Rhythm: Normal rate.   Pulmonary:      Effort: Pulmonary effort is normal.      Breath sounds: Normal breath sounds.   Abdominal:      General: Abdomen is flat.      Palpations: Abdomen is soft.   Musculoskeletal:         General: Normal range of motion.      Cervical back: Normal range of motion.      Comments: There is a very superficial 1 cm laceration to the middle phalanx of the left index finger.  No bleeding.  Already healing.  There is also swelling and redness to the dorsum of the left hand.  No neurological deficit.  Has full range of motion.   Skin:     General: Skin is warm and dry.   Neurological:      General: No focal deficit present.      Mental Status: She is alert.           ED Course & MDM                  No data recorded     Keanu Coma Scale Score: 15 (05/13/25 2225 : Mariana Leung RN)                           Medical Decision Making  I suspected initially a boxer's fracture with the swelling present but x-ray was read by radiology as negative.  We did not have to sew the laceration but rather applied Steri-Strips.  We  also applied an Ace wrap for the swelling proximal to the laceration.  I gave her Toradol to help with the pain and have referred her back to her primary care provider if this becomes red swollen or more tender.        Procedure  Procedures         [1]   Past Medical History:  Diagnosis Date    Fever, unspecified 06/24/2016    Chills with fever    Pain in unspecified ankle and joints of unspecified foot 06/24/2016    Ankle pain    Pain in unspecified hip 07/18/2016    Hip pain    Personal history of other specified conditions     History of febrile seizure   [2] No past surgical history on file.  [3] No family history on file.  [4]   Social History  Tobacco Use    Smoking status: Never    Smokeless tobacco: Never   Substance Use Topics    Alcohol use: Not on file     Comment: rare    Drug use: Never        Harley Duke MD  05/14/25 0039

## 2025-05-14 NOTE — ED PROCEDURE NOTE
Procedure  Laceration Repair    Performed by: Harley Duke MD  Authorized by: Harley Duke MD    Consent:     Consent obtained:  Verbal    Risks discussed:  Pain  Universal protocol:     Procedure explained and questions answered to patient or proxy's satisfaction: yes      Relevant documents present and verified: no      Test results available: no      Imaging studies available: yes      Required blood products, implants, devices, and special equipment available: no      Site/side marked: no      Immediately prior to procedure, a time out was called: yes      Patient identity confirmed:  Verbally with patient  Anesthesia:     Anesthesia method:  None  Laceration details:     Location:  Finger    Length (cm):  1    Depth (mm):  1  Exploration:     Hemostasis achieved with:  Direct pressure    Contaminated: no    Treatment:     Area cleansed with:  Saline    Amount of cleaning:  Standard    Irrigation solution:  Sterile saline  Skin repair:     Repair method:  Steri-Strips  Approximation:     Approximation:  Loose               Harley Duke MD  05/14/25 0043